# Patient Record
Sex: MALE | Race: WHITE | Employment: FULL TIME | URBAN - METROPOLITAN AREA
[De-identification: names, ages, dates, MRNs, and addresses within clinical notes are randomized per-mention and may not be internally consistent; named-entity substitution may affect disease eponyms.]

---
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
## 2020-03-31 VITALS — WEIGHT: 185 LBS | BODY MASS INDEX: 25.9 KG/M2 | HEIGHT: 71 IN

## 2020-03-31 ASSESSMENT — MIFFLIN-ST. JEOR: SCORE: 1858.34

## 2020-03-31 NOTE — PATIENT INSTRUCTIONS
Set bedtime 11:30 pm and alarm clock at 7:30 pm with outside activity in sunlight after awakening     Keep sleep diary for 2 weeks and enter nights when you are bothered by restlessness symptoms.    Link for sleep diary http://[email or web address]/sleepdiary.pdf    If melatonin helps you, it might be best to take it before bedtime          Cognitive Behavioral Therapy for Insomnia (CBT-I)    Developing Healthy Sleep Thoughts    Insomnia is often is triggered by stressful events such as a change in employment, a separation, medical illness, or loss.  How you handle your sleep problem, mainly your thoughts and behaviors, determines in large part whether your sleeplessness is short -term or develops into chronic insomnia well after the stressful event is over.     This step of your program involves learning a set of skills to change negative and worrisome thoughts about sleep.   Insomnia is more likely to persist if you interpret the onset as a threat or loss of control.  Worry, fears and untrue beliefs about insomnia can become a vicious cycle.  Negative sleep thoughts activate the physical and emotional systems of your body.  This strengthens wakefulness and weakens your sleep system.  This in turn produces increased stress and pressure to sleep.  We call this unhelpful sleep effort.     Changing How You Think About Insomnia    We now know that our thoughts and attitudes affect our stress response.  Negative sleep thoughts can worsen your insomnia. They can lead to greater fear or anxiety about sleep, which in turn can aggravate your sleep problem. Thinking more positively and realistically about your sleep promotes its health and healing.     Myths about Sleep    ? People need 8 hours of sleep     This is untrue.  Sleep needs vary from person to person.  Most people need between 6-8 hours to feel alert during the day.  People who sleep 7 hours live longer than those who sleep 8 hours.  Research also suggests  people who sleep 5 hours live longer than those who sleep 9 hours    ? Insomnia is the same as sleep deprivation    Sleep deprivation is lack of sleep due to not allowing enough time for sleep.  This is typically not true for insomnia where people have enough time for sleep and even extend their sleep time trying to get more sleep.  Most people with insomnia get about the same amount of sleep as normal sleepers.  The difference is that with insomnia the sleep is fragmented and less consolidated.    You are Getting More Sleep than You Think    Research using objective sleep tests reveal that people with insomnia get an average of one hour more sleep than they think. This is due in part because the brain misperceives Stage 1 and 2 sleep as being awake.  In addition, stress and arousal while awake in bed changes the brain's perception of time awake.    Examples of Unhealthy Sleep Thoughts    Negative sleep thoughts can have a profound impact on your ability to get a good night's sleep. Below are some examples of negative thoughts associated with insomnia that may sound familiar to you:    ? I must get 8 hours of sleep to function during the day.  ? I won't get to sleep tonight.  ? Insomnia is going to cause health problems.  ? I am dreading going to bed.  ? I woke up early again.  I know I won't get back to sleep.  ? The reason I feel terrible today is because of my insomnia.  ? I've totally lost control of my sleep.  ? I can't sleep without a sleeping pill.    Negative thoughts usually occur automatically and feel like a knee-jerk reaction.  They are often untrue or distorted, especially late in the evening as you become increasingly tired and others are asleep.      Changing Unhealthy Sleep Thoughts    Now that you understand the impact that negative thoughts can have on your sleep, you are ready to change these unhelpful thoughts.  The strategy is powerful and simple:  By recognizing and replacing your negative  thoughts about sleep with more accurate, positive thoughts, you will be less anxious and frustrated about your sleep. A more realistic and positive attitude about sleep will allow you to relax and sleep more easily through the night.           There are several important steps involved in changing your unhelpful and negative thoughts about sleep:            Examples of Healthy Sleep Thoughts    ? My work will not suffer much if I have a poor night's sleep.    ? I'm probably getting more sleep than I think.    ? Other things than my sleep affect my daytime functioning.    ? Because I didn't sleep well last night, I am more likely to sleep well tonight because of increased sleep drive that leads to deeper sleep.    ? Sleep requirements vary from one person to another.    ? If I don't sleep well, most of the time the worst that can happen is that my mood might not be as bright the next day.    ? If I awaken after about 5 hours of sleep, I have gotten the core sleep I need for the day.    ? I'm more likely to fall asleep the longer I've been awake    ? I'm more likely to fall asleep as my body temperature begins to decrease through the night.    ? My body's wakefulness system takes charge during the day to promote daytime functioning.    ? These sleep skills have worked for others, and they can work for me.    Other Recommendations for Changing Beliefs and Attitudes   About Your Sleep    ? Keep Realistic Expectations     There is a widespread belief that 8 hours of sleep is necessary to feel refreshed and function well during the day. Many believe that good sleep means never waking up at night.  Others come to expect that they should always wake up in the morning feeling full of energy.  Concerns may arise when your actual sleep falls short of these expectations. Try to avoid placing undue pressure on yourself to achieve certain sleep levels.  It only increases anxiety about sleeping.  Focus on quality sleep not  quantity of sleep.    ? Revise Your Thoughts about the Causes of Insomnia      There is a natural tendency to attribute our sleep problems completely to external factors such as a chemical imbalance, pain, aging or things over which we may have little control.  Although these factors may contribute to your insomnia, research shows CBT-I is beneficial even if they are present.    ? Don't Blame Insomnia for All Daytime Impairments      Many individuals blame insomnia for every symptom or concern they experience during the day from fatigue to lack of concentration. Though poor sleep may produce some of these symptoms, it us usually untrue that all daytime impairment is attributable to insomnia.  It is more often that other factors such as stress and co-occurring medical problems contribute more to how you feel during the day.      ? Don't Catastrophize      Catastrophic thinking means making a sleep mountain out of a molehill.  Some people believe poor sleep will have catastrophic consequences to their physical health, mental health and appearance. Others see insomnia as a complete loss of control.  These perceived consequences of insomnia often prompt people to seek medication or other treatment.  Keep in mind insomnia can be very unpleasant but for the most part is not dangerous.    ? Don't Focus on Sleep     Some people reduce their activity level because of poor sleep.  Although sleep is a necessary part of life, don't make it the focus of your thoughts and concern. Trust that if you engage in health sleep habits, your body will give you the sleep it needs.  Make sure you continue your normal activities despite your insomnia.    ? Never Try to Sleep      Of all the habits the most important one is this:  Never try to force yourself to sleep.  Doing so usually backfires and makes things worse. Instead, focus on keeping to your prescribed sleep schedule, getting up at the same time every day and using the bed only  "for sleep.        We would like to share some general information about sleep to help us work together to get better sleep for you.     Why do we sleep?   -clear toxins from the brain   -remove unnecessary neural connections that accumulate during the day   -enhance memory and learning   Without adequate sleep, our brain may become impaired in a manner that is similar to being intoxicated.       How much sleep do we need?   -The average adult needs 7-8 hours of sleep while young adolescents need up to 9 hours in order to function at their best.   -Between 12 and 20 years of age our sleep is often delayed up to an hour compared to older or younger people.   Aim for 7-8 sleep and a regular schedule; it may take a week or more to eliminate the effects of chronic insufficient sleep.       When should I sleep?   The timing of sleep is determined genetically for each of us.  Some of us are \"night owls\" and others are \"larks\".   The timing of sleep and the amount of sleep we need changes with our age.   Try to schedule your sleep time to fit your natural sleep schedule when you are not busy with school, work, or travel.       What if my sleep schedule doesn't fit my work schedule?   -If you have no trouble falling asleep or getting up during the work week, your work schedule is probably well-matched to your natural sleep schedule.   -You may benefit from a sleep  who can help support you to get to the best schedule.       What are some good habits to start with?   -Your bedroom is for sleeping and should be quiet, comfortably cool and dark before you lie down.   -You should not have electronic devices such as phones or computers in your bedroom.   -Your bedtime and awakening time should fit your natural tendency to fall asleep and wake up and should not vary much between weekdays and weekends.   -No caffeine within 6 hours or alcohol within two hours of bedtime  Be careful and regular with your sleep-you will feel " better and think better.

## 2020-03-31 NOTE — PROGRESS NOTES
[facility]   Outpatient Sleep Medicine Consultation  March 31, 2020      Name: Ronald Alberto MRN# [id number]   Age: 20 year old YOB: 2000     Date of Consultation: March 31, 2020  Consultation is requested by: No referring provider defined for this encounter. No ref. provider found  Primary care provider: No Ref-Primary, Physician         Reason for Sleep Consult:     Ronald Alberto is a 20 year old male for complaints of 8 months with worsening insomnia         Assessment and Plan:     Summary Sleep Diagnoses:    Insomnia with features of       Excessive time in bed    Psychophysiological insomnia    Restless legs syndrome [familal]    Comorbid diagnoses:    Bruxism    Remote history of ADHD and depression    History of obsessive-compulsive disorder        Summary Recommendations:      Set bedtime 11:30 pm and alarm clock at 7:30 pm with outside activity in sunlight after awakening     Keep sleep diary for 2 weeks and enter nights when you are bothered by restlessness symptoms.    Link for sleep diary http://yoursleep.aasmnet.org/pdf/[email or web address].pdf    If melatonin helps you, it might be best to take it before bedtime    Will consider increasing evening gabapentin dose if restless leg symptoms worsen.      He will return for a video visit in 2 weeks with sleep hygiene measures and 8 hours in bed with a sleep diary.  Long-term this gentleman is likely to have recurrent problems of insomnia relative to sleep habits and anxiety symptoms.  We would like to see him reengage with his mental health providers.         History of Present Illness:     Ronald Alberto is a 20 year old male with new onset of insomnia in the setting of with increasing anxiety and social stressors and prior depression in early adolescence.  Hospitalized in past 1 and 3 years ago for anxiety and medicated with anxiety.  This gentleman has prior diagnoses of Tourette's syndrome and obsessive-compulsive  "disorder which he did not reveal during the interview.  He indicated he wanted to talk about his\" circadian rhythm problem\" although he denies tendency for sleep phase delay or advancement.  He denies panic attacks and tends to minimizes current anxiety or depressive symptoms.  He indicates that he has previously been under the care of a psychiatrist but is not currently medicated and not seeing a psychiatrist nor a therapist.  On the other hand he does endorse recent stressors including difficulty holding a job, recent break-up with his girlfriend, and the need currently to live with his grandmother.  He denies prior abuse traumas or nightmares.    This patient has a family history of restless leg syndrome and intermittently notes extreme urge to move his legs however this is not currently occurring.  He has no history of iron therapy or low ferritin levels.    He does endorse occasional bruxism.      SLEEP-WAKE SCHEDULE:     10 pm-12am bedtime with 20 min latency; awakens 4 hours later 30-60 min nightly occaionally texting but usually falls asleep with some attention to fall asleep but denies rumination anxiety or regular use of screens television in the room.  He describes his bedroom as quiet and dark on most occasions.  He does not move out of the bed when he has had difficulty sleeping.  He describes a normal circadian pattern with sleep time from 11-7 throughout his adolescence however is currently awakening at 9 AM and having middle of the night awakenings in the setting of his extended time in bed.  He denies daytime sleepiness does not have inappropriate napping.    Relationship breakup, job instability      SCALES       SLEEP APNEA: Stopbang score         INSOMNIA:  Insomnia severity score: 19       SLEEPINESS: [place] sleepiness scale: 0    SLEEP COMPLAINTS:  Cardio-respiratory    Snoring- 0/week  Dyspnea- denies  Morning headaches or confusion-denies  Coexisting Lung disease: denies    Coexisting Heart " disease: ]denies    Does patient have a bed partner: denies  Has bed partner been sleeping separately because of snoring:  \denies            RLS Screen: When you try to relax in the evening or sleep at  night, do you ever have unpleasant, restless feelings in your  legs that can be relieved by walking or movement? Motor restlessness; occasional uncomfortable urge to move.      Sleep Behaviors:    Night terrors: denies    Bruxism: denies    Automatic behaviors: none    Other subjective complaints:    Pain and discomfort at  night: denies    Waking up with heart pounding or racing: denies    GERD or aspiration:denies         Parasomnia:   NREM - denies recurrent persistent confusional arousal, night eating, sleep walking or sleep terrors   REM  - denies dream enactment; injuries          Medications:     Current Outpatient Medications   Medication Sig     gabapentin (NEURONTIN) 300 MG capsule Take 1 capsule (300 mg) by mouth 3 times daily as needed (anxiety)     hydrOXYzine (ATARAX) 25 MG tablet Take 1-2 tablets (25-50 mg) by mouth every 4 hours as needed for anxiety     L-THEANINE PO Take 1 tablet by mouth daily     MELATONIN PO Take 1 tablet by mouth daily      NONFORMULARY Homeopathic remedy - Bach Rescue Remedy candy - Patient took one 8/18 at night     Omega-3 Fatty Acids (FISH OIL PO) Take 1 capsule by mouth daily     No current facility-administered medications for this visit.         Allergies   Allergen Reactions     Cefzil [Cefprozil] Rash     Full body rash              Past Medical History:     Does not need 02 supplement at night   Past Medical History:   Diagnosis Date     ADHD (attention deficit hyperactivity disorder)      Depression      OCD (obsessive compulsive disorder)              Past Surgical History:    Previous upper airway surgery   Past Surgical History:   Procedure Laterality Date     ENT SURGERY              Social History:     Social History     Tobacco Use     Smoking status: Never  Smoker     Smokeless tobacco: Never Used   Substance Use Topics     Alcohol use: No                    Family History:     Family History   Problem Relation Age of Onset     Glaucoma No family hx of      Macular Degeneration No family hx of                Review of Systems:     A complete 10 point review of systems was negative other than HPI or as commented below:   none.            Data: All pertinent previous laboratory data reviewed     No results found for: PH, PHARTERIAL, PO2, [id number], SAT, PCO2, HCO3, BASEEXCESS, INEZ, BEB  Lab Results   Component Value Date    TSH 1.16 08/19/2018    TSH 0.89 02/27/2017     Lab Results   Component Value Date    GLC 81 08/19/2018    GLC 85 02/27/2017     Lab Results   Component Value Date    HGB 17.0 08/19/2018    HGB 15.1 11/16/2016     Lab Results   Component Value Date    BUN 19 08/19/2018    BUN 16 02/27/2017    CR 1.36 (H) 08/19/2018    CR 1.21 (H) 02/27/2017     Lab Results   Component Value Date    AST 23 08/19/2018    AST 26 02/27/2017    ALT 48 08/19/2018    ALT 42 02/27/2017    ALKPHOS 100 08/19/2018    ALKPHOS 126 02/27/2017    BILITOTAL 0.7 08/19/2018    BILITOTAL 0.4 02/27/2017     Lab Results   Component Value Date    UAMP Negative 08/18/2018    UBARB Negative 08/18/2018    BENZODIAZEUR Negative 08/18/2018    UCANN Negative 08/18/2018    UCOC Negative 08/18/2018    OPIT Negative 08/18/2018    UPCP Negative 08/18/2018         Copy to: No Ref-Primary, Physician      LIZZ SCHILLING MD 3/31/2020     Total time spent with patient: 60 min >50% counseling

## 2020-04-01 ENCOUNTER — TELEPHONE (OUTPATIENT)
Dept: SLEEP MEDICINE | Facility: CLINIC | Age: 20
End: 2020-04-01

## 2020-04-01 ENCOUNTER — VIRTUAL VISIT (OUTPATIENT)
Dept: SLEEP MEDICINE | Facility: CLINIC | Age: 20
End: 2020-04-01
Payer: COMMERCIAL

## 2020-04-01 DIAGNOSIS — F51.04 PSYCHOPHYSIOLOGICAL INSOMNIA: Primary | ICD-10-CM

## 2020-04-01 PROCEDURE — 99205 OFFICE O/P NEW HI 60 MIN: CPT | Mod: 95 | Performed by: INTERNAL MEDICINE

## 2020-04-01 NOTE — TELEPHONE ENCOUNTER
Malinid pt to schedule a follow up and pt said he will look at billing statement for this visit to see how much it cost then he will cb to schedule appt

## 2020-10-06 ENCOUNTER — TELEPHONE (OUTPATIENT)
Dept: SLEEP MEDICINE | Facility: CLINIC | Age: 20
End: 2020-10-06

## 2020-10-06 NOTE — TELEPHONE ENCOUNTER
Pt was going to look into cost in April and call us back [facility] know if he wanted to still schedule a follow up.

## 2020-11-10 ENCOUNTER — TELEPHONE (OUTPATIENT)
Dept: SLEEP MEDICINE | Facility: CLINIC | Age: 20
End: 2020-11-10

## 2020-11-17 RX ORDER — BUPROPION HYDROCHLORIDE 150 MG/1
TABLET ORAL
COMMUNITY
Start: 2020-06-05

## 2020-11-17 RX ORDER — ESZOPICLONE 1 MG/1
1 TABLET, FILM COATED ORAL AT BEDTIME
COMMUNITY
End: 2020-11-17

## 2020-11-17 NOTE — PROGRESS NOTES
"Ronald Alberto is a 20 year old male who is being evaluated via a billable telephone visit.      The patient has been notified of following:     \"This telephone visit will be conducted via a call between you and your physician/provider. We have found that certain health care needs can be provided without the need for a physical exam.  This service lets us provide the care you need with a short phone conversation.  If a prescription is necessary we can send it directly to your pharmacy.  If lab work is needed we can place an order for that and you can then stop by our lab to have the test done at a later time.    Telephone visits are billed at different rates depending on your insurance coverage. During this emergency period, for some insurers they may be billed the same as an in-person visit.  Please reach out to your insurance provider with any questions.    If during the course of the call the physician/provider feels a telephone visit is not appropriate, you will not be charged for this service.\"    Patient has given verbal consent for Telephone visit?  Yes    What phone number would you like to be contacted at? [phone]    How would you like to obtain your AVS? MyChart    Phone call duration: 30 minutes    LIZZ SCHILLING MD     [facility]   Outpatient Sleep Medicine Consultation  November 18, 2020    Name: oRnald Alberto MRN# [id number]   Age: 20 year old YOB: 2000     Date of Consultation: November 18, 2020  Consultation is requested by: No referring provider defined for this encounter.  Primary care provider: No Ref-Primary, Physician           Assessment and Plan:     Ronald Alberto is a 20 year old male for complaints of 8 months with worsening insomnia     Summary Sleep Diagnoses:     Insomnia with features of        Excessive time in bed    Psychophysiological insomnia    Restless legs syndrome [familal]     Comorbid diagnoses:    Bruxism    Remote history of ADHD " and depression    History of obsessive-compulsive disorder           Summary Recommendations:  PREVIOUS VISIT--->results-->current recommendations    Set bedtime 11:30 pm and alarm clock at 7:30 pm with outside activity in sunlight after awakening --> moved bedtime to 9:30 to 10pm with 4-5 awakenings for 20 minutes, clock watching and with awakening at 6:30 am with alarm, and given eszopilclone by another provider whose name he could not verify-->We indicated that sleep promoting agents are generally recommended by guidelines for initial management of insomnia and that we would not recommend long-term therapy with this agent in the absence of a trial of cognitive behavioral therapy for insomnia.  We did offer further approach with restricting time in bed and reinforcing sleep habits with actigraphy to better allow coaching.  He is not interested in the cost related to this ongoing management.  He will contact us if he has interested in pursuing these therapies.  Information on cognitive behavioral therapy for insomnia and good sleep habits were provided in the instructions.           History of Present Illness:     20-year-old male with new onset insomnia initiated after evening caffeine use and in the setting of social stressors as well as a prior history of depression in adolescence.  After his initial visit review of the chart did verify previous diagnoses of Tourette's syndrome and obsessive-compulsive disorder.  We have encouraged the patient to have contact with his regular mental health providers.  We have discussed the potential benefit of restricting time in bed which he attempted to do but which was complicated by clock watching and subsequent advance to an earlier bedtime.  He was subsequently started on on the eszopiclone by another provider.                 Medications:     Current Outpatient Medications   Medication Sig     MELATONIN PO Take 1 tablet by mouth daily      buPROPion (WELLBUTRIN XL) 150 MG  24 hr tablet TK 1 T PO QD     No current facility-administered medications for this visit.         Allergies   Allergen Reactions     Cefzil [Cefprozil] Rash     Full body rash              Past Medical History:     Does not need 02 supplement at night   Past Medical History:   Diagnosis Date     ADHD (attention deficit hyperactivity disorder)      Depression      OCD (obsessive compulsive disorder)              Past Surgical History:    No h/o  upper airway surgery  Past Surgical History:   Procedure Laterality Date     ENT SURGERY                        Physical Examination:     Objective   Reported vitals:  There were no vitals taken for this visit.   healthy, alert and no distress  Psych: Alert and oriented times 3; coherent speech, normal   rate and volume, able to articulate logical thoughts, able   to abstract reason, no tangential thoughts, no hallucinations   or delusions  His affect is normal.         Copy to: No Ref-Primary, Physician      LIZZ SCHILLING MD 11/18/2020     [facility]   Floor 1, [address]   [address] [place] [address]. [place] [address]   Appointments: [phone]    [facility]  3rd Floor  [address], [place] [address]     Total time spent with patient: 30 min >50% counseling

## 2020-11-18 ENCOUNTER — VIRTUAL VISIT (OUTPATIENT)
Dept: SLEEP MEDICINE | Facility: CLINIC | Age: 20
End: 2020-11-18
Payer: COMMERCIAL

## 2020-11-18 DIAGNOSIS — F51.04 PSYCHOPHYSIOLOGICAL INSOMNIA: Primary | ICD-10-CM

## 2020-11-18 PROCEDURE — 99214 OFFICE O/P EST MOD 30 MIN: CPT | Mod: 95 | Performed by: INTERNAL MEDICINE

## 2020-11-18 NOTE — PATIENT INSTRUCTIONS
Cognitive Behavioral Therapy for Insomnia (CBT-I)    Developing Healthy Sleep Thoughts    Insomnia is often is triggered by stressful events such as a change in employment, a separation, medical illness, or loss.  How you handle your sleep problem, mainly your thoughts and behaviors, determines in large part whether your sleeplessness is short -term or develops into chronic insomnia well after the stressful event is over.     This step of your program involves learning a set of skills to change negative and worrisome thoughts about sleep.   Insomnia is more likely to persist if you interpret the onset as a threat or loss of control.  Worry, fears and untrue beliefs about insomnia can become a vicious cycle.  Negative sleep thoughts activate the physical and emotional systems of your body.  This strengthens wakefulness and weakens your sleep system.  This in turn produces increased stress and pressure to sleep.  We call this unhelpful sleep effort.     Changing How You Think About Insomnia    We now know that our thoughts and attitudes affect our stress response.  Negative sleep thoughts can worsen your insomnia. They can lead to greater fear or anxiety about sleep, which in turn can aggravate your sleep problem. Thinking more positively and realistically about your sleep promotes its health and healing.     Myths about Sleep    ? People need 8 hours of sleep     This is untrue.  Sleep needs vary from person to person.  Most people need between 6-8 hours to feel alert during the day.  People who sleep 7 hours live longer than those who sleep 8 hours.  Research also suggests people who sleep 5 hours live longer than those who sleep 9 hours    ? Insomnia is the same as sleep deprivation    Sleep deprivation is lack of sleep due to not allowing enough time for sleep.  This is typically not true for insomnia where people have enough time for sleep and even extend their sleep time trying to get more sleep.  Most  people with insomnia get about the same amount of sleep as normal sleepers.  The difference is that with insomnia the sleep is fragmented and less consolidated.    You are Getting More Sleep than You Think    Research using objective sleep tests reveal that people with insomnia get an average of one hour more sleep than they think. This is due in part because the brain misperceives Stage 1 and 2 sleep as being awake.  In addition, stress and arousal while awake in bed changes the brain's perception of time awake.    Examples of Unhealthy Sleep Thoughts    Negative sleep thoughts can have a profound impact on your ability to get a good night's sleep. Below are some examples of negative thoughts associated with insomnia that may sound familiar to you:    ? I must get 8 hours of sleep to function during the day.  ? I won't get to sleep tonight.  ? Insomnia is going to cause health problems.  ? I am dreading going to bed.  ? I woke up early again.  I know I won't get back to sleep.  ? The reason I feel terrible today is because of my insomnia.  ? I've totally lost control of my sleep.  ? I can't sleep without a sleeping pill.    Negative thoughts usually occur automatically and feel like a knee-jerk reaction.  They are often untrue or distorted, especially late in the evening as you become increasingly tired and others are asleep.      Changing Unhealthy Sleep Thoughts    Now that you understand the impact that negative thoughts can have on your sleep, you are ready to change these unhelpful thoughts.  The strategy is powerful and simple:  By recognizing and replacing your negative thoughts about sleep with more accurate, positive thoughts, you will be less anxious and frustrated about your sleep. A more realistic and positive attitude about sleep will allow you to relax and sleep more easily through the night.           There are several important steps involved in changing your unhelpful and negative thoughts about  sleep:            Examples of Healthy Sleep Thoughts    ? My work will not suffer much if I have a poor night's sleep.    ? I'm probably getting more sleep than I think.    ? Other things than my sleep affect my daytime functioning.    ? Because I didn't sleep well last night, I am more likely to sleep well tonight because of increased sleep drive that leads to deeper sleep.    ? Sleep requirements vary from one person to another.    ? If I don't sleep well, most of the time the worst that can happen is that my mood might not be as bright the next day.    ? If I awaken after about 5 hours of sleep, I have gotten the core sleep I need for the day.    ? I'm more likely to fall asleep the longer I've been awake    ? I'm more likely to fall asleep as my body temperature begins to decrease through the night.    ? My body's wakefulness system takes charge during the day to promote daytime functioning.    ? These sleep skills have worked for others, and they can work for me.    Other Recommendations for Changing Beliefs and Attitudes   About Your Sleep    ? Keep Realistic Expectations     There is a widespread belief that 8 hours of sleep is necessary to feel refreshed and function well during the day. Many believe that good sleep means never waking up at night.  Others come to expect that they should always wake up in the morning feeling full of energy.  Concerns may arise when your actual sleep falls short of these expectations. Try to avoid placing undue pressure on yourself to achieve certain sleep levels.  It only increases anxiety about sleeping.  Focus on quality sleep not quantity of sleep.    ? Revise Your Thoughts about the Causes of Insomnia      There is a natural tendency to attribute our sleep problems completely to external factors such as a chemical imbalance, pain, aging or things over which we may have little control.  Although these factors may contribute to your insomnia, research shows CBT-I is  beneficial even if they are present.    ? Don't Blame Insomnia for All Daytime Impairments      Many individuals blame insomnia for every symptom or concern they experience during the day from fatigue to lack of concentration. Though poor sleep may produce some of these symptoms, it us usually untrue that all daytime impairment is attributable to insomnia.  It is more often that other factors such as stress and co-occurring medical problems contribute more to how you feel during the day.      ? Don't Catastrophize      Catastrophic thinking means making a sleep mountain out of a molehill.  Some people believe poor sleep will have catastrophic consequences to their physical health, mental health and appearance. Others see insomnia as a complete loss of control.  These perceived consequences of insomnia often prompt people to seek medication or other treatment.  Keep in mind insomnia can be very unpleasant but for the most part is not dangerous.    ? Don't Focus on Sleep     Some people reduce their activity level because of poor sleep.  Although sleep is a necessary part of life, don't make it the focus of your thoughts and concern. Trust that if you engage in health sleep habits, your body will give you the sleep it needs.  Make sure you continue your normal activities despite your insomnia.    ? Never Try to Sleep      Of all the habits the most important one is this:  Never try to force yourself to sleep.  Doing so usually backfires and makes things worse. Instead, focus on keeping to your prescribed sleep schedule, getting up at the same time every day and using the bed only for sleep.

## 2020-11-22 ENCOUNTER — HEALTH MAINTENANCE LETTER (OUTPATIENT)
Age: 20
End: 2020-11-22

## 2021-01-07 ENCOUNTER — PATIENT OUTREACH (OUTPATIENT)
Dept: CARE COORDINATION | Facility: CLINIC | Age: 21
End: 2021-01-07

## 2021-01-07 DIAGNOSIS — F41.9 ANXIETY AND DEPRESSION: Primary | ICD-10-CM

## 2021-01-07 DIAGNOSIS — F32.A ANXIETY AND DEPRESSION: Primary | ICD-10-CM

## 2021-01-07 SDOH — ECONOMIC STABILITY: TRANSPORTATION INSECURITY
IN THE PAST 12 MONTHS, HAS LACK OF TRANSPORTATION KEPT YOU FROM MEETINGS, WORK, OR FROM GETTING THINGS NEEDED FOR DAILY LIVING?: NO

## 2021-01-07 SDOH — ECONOMIC STABILITY: INCOME INSECURITY: HOW HARD IS IT FOR YOU TO PAY FOR THE VERY BASICS LIKE FOOD, HOUSING, MEDICAL CARE, AND HEATING?: NOT HARD AT ALL

## 2021-01-07 SDOH — HEALTH STABILITY: MENTAL HEALTH
STRESS IS WHEN SOMEONE FEELS TENSE, NERVOUS, ANXIOUS, OR CAN'T SLEEP AT NIGHT BECAUSE THEIR MIND IS TROUBLED. HOW STRESSED ARE YOU?: TO SOME EXTENT

## 2021-01-07 SDOH — ECONOMIC STABILITY: TRANSPORTATION INSECURITY
IN THE PAST 12 MONTHS, HAS THE LACK OF TRANSPORTATION KEPT YOU FROM MEDICAL APPOINTMENTS OR FROM GETTING MEDICATIONS?: NO

## 2021-01-07 SDOH — ECONOMIC STABILITY: FOOD INSECURITY: WITHIN THE PAST 12 MONTHS, YOU WORRIED THAT YOUR FOOD WOULD RUN OUT BEFORE YOU GOT MONEY TO BUY MORE.: NEVER TRUE

## 2021-01-07 SDOH — SOCIAL STABILITY: SOCIAL INSECURITY: WITHIN THE LAST YEAR, HAVE YOU BEEN AFRAID OF YOUR PARTNER OR EX-PARTNER?: NOT ASKED

## 2021-01-07 SDOH — ECONOMIC STABILITY: FOOD INSECURITY: WITHIN THE PAST 12 MONTHS, THE FOOD YOU BOUGHT JUST DIDN'T LAST AND YOU DIDN'T HAVE MONEY TO GET MORE.: NEVER TRUE

## 2021-01-07 ASSESSMENT — ACTIVITIES OF DAILY LIVING (ADL): DEPENDENT_IADLS:: INDEPENDENT

## 2021-01-12 ENCOUNTER — PATIENT OUTREACH (OUTPATIENT)
Dept: CARE COORDINATION | Facility: CLINIC | Age: 21
End: 2021-01-12

## 2021-02-16 ENCOUNTER — PATIENT OUTREACH (OUTPATIENT)
Dept: CARE COORDINATION | Facility: CLINIC | Age: 21
End: 2021-02-16

## 2021-04-08 ENCOUNTER — PATIENT OUTREACH (OUTPATIENT)
Dept: CARE COORDINATION | Facility: CLINIC | Age: 21
End: 2021-04-08

## 2021-09-19 ENCOUNTER — HEALTH MAINTENANCE LETTER (OUTPATIENT)
Age: 21
End: 2021-09-19

## 2022-01-09 ENCOUNTER — HEALTH MAINTENANCE LETTER (OUTPATIENT)
Age: 22
End: 2022-01-09

## 2022-11-21 ENCOUNTER — HEALTH MAINTENANCE LETTER (OUTPATIENT)
Age: 22
End: 2022-11-21

## 2023-04-16 ENCOUNTER — HEALTH MAINTENANCE LETTER (OUTPATIENT)
Age: 23
End: 2023-04-16